# Patient Record
Sex: MALE | Race: WHITE | NOT HISPANIC OR LATINO | URBAN - METROPOLITAN AREA
[De-identification: names, ages, dates, MRNs, and addresses within clinical notes are randomized per-mention and may not be internally consistent; named-entity substitution may affect disease eponyms.]

---

## 2024-05-23 ENCOUNTER — OFFICE VISIT (OUTPATIENT)
Dept: URGENT CARE | Facility: CLINIC | Age: 24
End: 2024-05-23
Payer: COMMERCIAL

## 2024-05-23 VITALS
WEIGHT: 190 LBS | BODY MASS INDEX: 27.2 KG/M2 | SYSTOLIC BLOOD PRESSURE: 122 MMHG | RESPIRATION RATE: 18 BRPM | TEMPERATURE: 99 F | HEIGHT: 70 IN | HEART RATE: 72 BPM | OXYGEN SATURATION: 98 % | DIASTOLIC BLOOD PRESSURE: 77 MMHG

## 2024-05-23 DIAGNOSIS — M54.50 ACUTE MIDLINE LOW BACK PAIN WITHOUT SCIATICA: ICD-10-CM
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
## 2024-05-23 DIAGNOSIS — M54.2 NECK PAIN: ICD-10-CM

## 2024-05-23 PROCEDURE — 99203 OFFICE O/P NEW LOW 30 MIN: CPT | Mod: S$GLB,,,

## 2024-05-23 RX ORDER — METHOCARBAMOL 500 MG/1
500 TABLET, FILM COATED ORAL 4 TIMES DAILY PRN
Qty: 20 TABLET | Refills: 0 | Status: SHIPPED | OUTPATIENT
Start: 2024-05-23

## 2024-05-23 RX ORDER — IBUPROFEN 600 MG/1
600 TABLET ORAL 3 TIMES DAILY PRN
Qty: 20 TABLET | Refills: 0 | Status: SHIPPED | OUTPATIENT
Start: 2024-05-23
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
## 2024-05-23 NOTE — PATIENT INSTRUCTIONS
Take ibuprofen 3 times a day as needed for pain.  Robaxin up to 4 times a day as needed for muscle spasms.  Please be aware Robaxin can cause drowsiness.  Please continue to monitor your symptoms if persist or worsens please follow-up with your primary care doctor as discussed.    What care is needed at home?   Ask your doctor what you need to do when you go home. Make sure you ask questions if you do not understand what the doctor says. This way you will know what you need to do.  Your care at home will depend on what the cause of your problem is. This may include:  Avoiding or stopping activities that cause you pain.  Using a splint or brace for a period of time. This will let the injured area rest and heal.  Heat can help lower pain. Your doctor may suggest that you soak in warm water. If your doctor tells you to use heat, put a heating pad on the painful part for no more than 20 minutes at a time. Never go to sleep with a heating pad on as this can cause burns.  Place an ice pack or a bag of frozen peas wrapped in a towel over the painful part. Never put ice right on the skin. Do not leave the ice on more than 10 to 15 minutes at a time.  Exercises to stretch and make muscles stronger.  Methods to help you relax  Massage therapy  Other therapies such as acupuncture and acupressure  Pain relieving or anti-inflammatory drugs. These can be taken by mouth or given as a shot into or near the painful part.  Other drugs for patients with some health problems such as fibromyalgia. These would help with sleep, pain, and the immune system.  Surgery may be needed for problems such as bone breaks or other injuries.  What follow-up care is needed?   Your doctor may ask you to make visits to the office to check on your progress. Be sure to keep all these visits. Your doctor may send you to physical therapy, occupational therapy, or a chiropractor to help you heal faster.  Will physical activity be limited?   Your doctor may  ask you to rest and limit your activity. This could last for a few days to a number of weeks based on how bad your problem is. Your doctor may want you to use a brace or splint to keep your injured area still for a while.  What can be done to prevent this health problem?   Some injuries are due to using a muscle in the same way over and over again. You may need to stop or limit an activity to let your injury heal.  Lead an active lifestyle and keep your muscles strong and flexible to keep these injuries from happening.  Keep a healthy weight to avoid too much strain on your joints and muscles.  Use good posture and good body mechanics. This will help you stay pain free.  When do I need to call the doctor?   Signs of infection. These include a fever of 100.4°F (38°C) or higher, chills, very bad sore throat, ear or sinus pain, cough, more sputum or change in color of sputum, pain with passing urine, mouth sores, or wound that will not heal.  If your pain does not go away and your drugs are not helping  If you have very bad pain and you do not know why  You are not feeling better in 2 to 3 days or you are feeling worse  - Rest.    - Drink plenty of fluids.    - Acetaminophen (tylenol) or Ibuprofen (advil,motrin) as directed as needed for fever/pain. Avoid tylenol if you have a history of liver disease. Do not take ibuprofen if you have a history of GI bleeding, kidney disease, or if you take blood thinners.     - Follow up with your PCP or specialty clinic as directed in the next 1-2 weeks if not improved or as needed.  You can call [phone] to schedule an appointment with the appropriate provider.    - Go to the ER or seek medical attention immediately if you develop new or worsening symptoms.     - You must understand that you have received an Urgent Care treatment only and that you may be released before all of your medical problems are known or treated.   - You, the patient, will arrange for follow up care as  instructed.   - If your condition worsens or fails to improve we recommend that you receive another evaluation at the ER immediately or contact your PCP to discuss your concerns or return here.